# Patient Record
Sex: FEMALE | Race: WHITE
[De-identification: names, ages, dates, MRNs, and addresses within clinical notes are randomized per-mention and may not be internally consistent; named-entity substitution may affect disease eponyms.]

---

## 2023-05-01 ENCOUNTER — HOSPITAL ENCOUNTER (EMERGENCY)
Dept: HOSPITAL 12 - ER | Age: 88
Discharge: HOME | End: 2023-05-01
Payer: MEDICARE

## 2023-05-01 VITALS — DIASTOLIC BLOOD PRESSURE: 65 MMHG | SYSTOLIC BLOOD PRESSURE: 132 MMHG

## 2023-05-01 VITALS — WEIGHT: 100 LBS | BODY MASS INDEX: 19.63 KG/M2 | HEIGHT: 60 IN
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
## 2023-05-01 DIAGNOSIS — S81.811A: Primary | ICD-10-CM
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
## 2023-05-01 DIAGNOSIS — Z86.73: ICD-10-CM

## 2023-05-01 PROCEDURE — A4663 DIALYSIS BLOOD PRESSURE CUFF: HCPCS

## 2023-05-02 ENCOUNTER — HOSPITAL ENCOUNTER (EMERGENCY)
Dept: HOSPITAL 12 - ER | Age: 88
Discharge: HOME | End: 2023-05-02
Payer: MEDICARE

## 2023-05-02 VITALS — BODY MASS INDEX: 18.33 KG/M2 | WEIGHT: 110 LBS | HEIGHT: 65 IN
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
## 2023-05-02 DIAGNOSIS — S61.411A: Primary | ICD-10-CM
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
## 2023-05-02 DIAGNOSIS — E78.5: ICD-10-CM
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
## 2023-05-02 DIAGNOSIS — Z86.73: ICD-10-CM

## 2023-05-02 PROCEDURE — A4663 DIALYSIS BLOOD PRESSURE CUFF: HCPCS

## 2023-05-02 NOTE — NUR
Patient a/o x 4. NAD noted. Ambulatory with a steady gait. Patient discharged 
to home in stable condition with .  Written and verbal after care 
instructions given. 

Patient verbalizes understanding of instructions. Stressed follow up or return 
to ER for worsening s/s. All belongings with patient and .

## 2025-01-10 ENCOUNTER — HOSPITAL ENCOUNTER (EMERGENCY)
Dept: HOSPITAL 12 - ER | Age: 89
Discharge: HOME | End: 2025-01-10
Payer: MEDICARE

## 2025-01-10 VITALS — DIASTOLIC BLOOD PRESSURE: 86 MMHG | OXYGEN SATURATION: 94 % | SYSTOLIC BLOOD PRESSURE: 106 MMHG

## 2025-01-10 VITALS — WEIGHT: 120 LBS | BODY MASS INDEX: 19.29 KG/M2 | HEIGHT: 66 IN

## 2025-01-10 DIAGNOSIS — Z88.7: ICD-10-CM
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
## 2025-01-10 DIAGNOSIS — S51.012A: Primary | ICD-10-CM

## 2025-01-10 DIAGNOSIS — Z86.73: ICD-10-CM
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
## 2025-01-10 DIAGNOSIS — E78.5: ICD-10-CM

## 2025-01-10 PROCEDURE — A4663 DIALYSIS BLOOD PRESSURE CUFF: HCPCS

## 2025-01-10 PROCEDURE — A4606 OXYGEN PROBE USED W OXIMETER: HCPCS

## 2025-01-13 ENCOUNTER — HOSPITAL ENCOUNTER (EMERGENCY)
Dept: HOSPITAL 12 - ER | Age: 89
Discharge: HOME | End: 2025-01-13
Payer: MEDICARE

## 2025-01-13 VITALS — DIASTOLIC BLOOD PRESSURE: 75 MMHG | SYSTOLIC BLOOD PRESSURE: 110 MMHG | OXYGEN SATURATION: 100 %

## 2025-01-13 VITALS — HEIGHT: 66 IN | BODY MASS INDEX: 19.29 KG/M2 | WEIGHT: 120 LBS

## 2025-01-13 DIAGNOSIS — Z88.7: ICD-10-CM

## 2025-01-13 DIAGNOSIS — E78.5: ICD-10-CM

## 2025-01-13 DIAGNOSIS — S51.012D: Primary | ICD-10-CM

## 2025-01-13 DIAGNOSIS — Z86.73: ICD-10-CM
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
## 2025-01-13 PROCEDURE — A4663 DIALYSIS BLOOD PRESSURE CUFF: HCPCS

## 2025-01-13 PROCEDURE — A4606 OXYGEN PROBE USED W OXIMETER: HCPCS

## 2025-01-17 ENCOUNTER — HOSPITAL ENCOUNTER (EMERGENCY)
Dept: HOSPITAL 12 - ER | Age: 89
Discharge: HOME | End: 2025-01-17
Payer: MEDICARE

## 2025-01-17 VITALS — BODY MASS INDEX: 19.29 KG/M2 | HEIGHT: 66 IN | WEIGHT: 120 LBS

## 2025-01-17 VITALS — OXYGEN SATURATION: 99 %

## 2025-01-17 DIAGNOSIS — Z86.73: ICD-10-CM

## 2025-01-17 DIAGNOSIS — Z88.7: ICD-10-CM

## 2025-01-17 DIAGNOSIS — S51.012A: ICD-10-CM
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
## 2025-01-17 DIAGNOSIS — E78.5: ICD-10-CM
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
## 2025-01-17 DIAGNOSIS — S51.812A: Primary | ICD-10-CM

## 2025-01-17 PROCEDURE — A4606 OXYGEN PROBE USED W OXIMETER: HCPCS

## 2025-01-17 PROCEDURE — A4663 DIALYSIS BLOOD PRESSURE CUFF: HCPCS

## 2025-01-17 RX ADMIN — BACITRACIN ZINC, NEOMYCIN, POLYMYXIN B ONE PKT: 400; 3.5; 5 OINTMENT TOPICAL at 09:41
